# Patient Record
Sex: FEMALE | Race: WHITE | NOT HISPANIC OR LATINO | Employment: OTHER | URBAN - METROPOLITAN AREA
[De-identification: names, ages, dates, MRNs, and addresses within clinical notes are randomized per-mention and may not be internally consistent; named-entity substitution may affect disease eponyms.]

---

## 2020-12-13 ENCOUNTER — RECORDS - HEALTHEAST (OUTPATIENT)
Dept: LAB | Facility: CLINIC | Age: 85
End: 2020-12-13

## 2020-12-13 LAB
ALBUMIN UR-MCNC: NEGATIVE MG/DL
APPEARANCE UR: CLEAR
BILIRUB UR QL STRIP: NEGATIVE
COLOR UR AUTO: YELLOW
GLUCOSE UR STRIP-MCNC: NEGATIVE MG/DL
HGB UR QL STRIP: NEGATIVE
KETONES UR STRIP-MCNC: NEGATIVE MG/DL
LEUKOCYTE ESTERASE UR QL STRIP: NEGATIVE
NITRATE UR QL: NEGATIVE
PH UR STRIP: 7.5 [PH] (ref 4.5–8)
SP GR UR STRIP: 1.01 (ref 1–1.03)
UROBILINOGEN UR STRIP-ACNC: NORMAL

## 2020-12-16 LAB — BACTERIA SPEC CULT: ABNORMAL

## 2021-02-11 ENCOUNTER — RECORDS - HEALTHEAST (OUTPATIENT)
Dept: LAB | Facility: CLINIC | Age: 86
End: 2021-02-11

## 2021-02-11 LAB
SARS-COV-2 PCR COMMENT: NORMAL
SARS-COV-2 RNA SPEC QL NAA+PROBE: NEGATIVE
SARS-COV-2 VIRUS SPECIMEN SOURCE: NORMAL

## 2021-03-03 ENCOUNTER — RECORDS - HEALTHEAST (OUTPATIENT)
Dept: LAB | Facility: CLINIC | Age: 86
End: 2021-03-03

## 2021-03-03 LAB
ALBUMIN UR-MCNC: NEGATIVE MG/DL
APPEARANCE UR: CLEAR
BILIRUB UR QL STRIP: NEGATIVE
COLOR UR AUTO: YELLOW
GLUCOSE UR STRIP-MCNC: NEGATIVE MG/DL
HGB UR QL STRIP: NEGATIVE
KETONES UR STRIP-MCNC: NEGATIVE MG/DL
LEUKOCYTE ESTERASE UR QL STRIP: NEGATIVE
NITRATE UR QL: NEGATIVE
PH UR STRIP: 5 [PH] (ref 4.5–8)
SP GR UR STRIP: 1.02 (ref 1–1.03)
UROBILINOGEN UR STRIP-ACNC: NORMAL

## 2021-03-04 LAB — BACTERIA SPEC CULT: NO GROWTH

## 2021-04-22 ENCOUNTER — RECORDS - HEALTHEAST (OUTPATIENT)
Dept: LAB | Facility: CLINIC | Age: 86
End: 2021-04-22

## 2021-04-29 ENCOUNTER — RECORDS - HEALTHEAST (OUTPATIENT)
Dept: LAB | Facility: CLINIC | Age: 86
End: 2021-04-29

## 2021-04-29 LAB
ALBUMIN UR-MCNC: NEGATIVE G/DL
APPEARANCE UR: CLEAR
BILIRUB UR QL STRIP: NEGATIVE
COLOR UR AUTO: YELLOW
GLUCOSE UR STRIP-MCNC: NEGATIVE MG/DL
HGB UR QL STRIP: NEGATIVE
KETONES UR STRIP-MCNC: NORMAL MG/DL
LEUKOCYTE ESTERASE UR QL STRIP: NEGATIVE
NITRATE UR QL: NEGATIVE
PH UR STRIP: 5 [PH] (ref 5–8)
SP GR UR STRIP: 1.03 (ref 1–1.03)
UROBILINOGEN UR STRIP-ACNC: NORMAL

## 2021-04-30 LAB — BACTERIA SPEC CULT: NO GROWTH

## 2021-09-07 PROCEDURE — U0003 INFECTIOUS AGENT DETECTION BY NUCLEIC ACID (DNA OR RNA); SEVERE ACUTE RESPIRATORY SYNDROME CORONAVIRUS 2 (SARS-COV-2) (CORONAVIRUS DISEASE [COVID-19]), AMPLIFIED PROBE TECHNIQUE, MAKING USE OF HIGH THROUGHPUT TECHNOLOGIES AS DESCRIBED BY CMS-2020-01-R: HCPCS | Mod: ORL | Performed by: INTERNAL MEDICINE

## 2021-09-08 ENCOUNTER — LAB REQUISITION (OUTPATIENT)
Dept: LAB | Facility: CLINIC | Age: 86
End: 2021-09-08
Payer: COMMERCIAL

## 2021-09-08 DIAGNOSIS — U07.1 COVID-19: ICD-10-CM

## 2021-09-08 LAB — SARS-COV-2 RNA RESP QL NAA+PROBE: NEGATIVE

## 2021-09-15 PROCEDURE — U0003 INFECTIOUS AGENT DETECTION BY NUCLEIC ACID (DNA OR RNA); SEVERE ACUTE RESPIRATORY SYNDROME CORONAVIRUS 2 (SARS-COV-2) (CORONAVIRUS DISEASE [COVID-19]), AMPLIFIED PROBE TECHNIQUE, MAKING USE OF HIGH THROUGHPUT TECHNOLOGIES AS DESCRIBED BY CMS-2020-01-R: HCPCS | Mod: ORL | Performed by: INTERNAL MEDICINE

## 2021-09-16 ENCOUNTER — LAB REQUISITION (OUTPATIENT)
Dept: LAB | Facility: CLINIC | Age: 86
End: 2021-09-16
Payer: COMMERCIAL

## 2021-09-16 DIAGNOSIS — U07.1 COVID-19: ICD-10-CM

## 2021-09-17 LAB — SARS-COV-2 RNA RESP QL NAA+PROBE: NOT DETECTED

## 2022-01-06 PROCEDURE — U0003 INFECTIOUS AGENT DETECTION BY NUCLEIC ACID (DNA OR RNA); SEVERE ACUTE RESPIRATORY SYNDROME CORONAVIRUS 2 (SARS-COV-2) (CORONAVIRUS DISEASE [COVID-19]), AMPLIFIED PROBE TECHNIQUE, MAKING USE OF HIGH THROUGHPUT TECHNOLOGIES AS DESCRIBED BY CMS-2020-01-R: HCPCS | Mod: ORL | Performed by: INTERNAL MEDICINE

## 2022-01-07 ENCOUNTER — LAB REQUISITION (OUTPATIENT)
Dept: LAB | Facility: CLINIC | Age: 87
End: 2022-01-07
Payer: COMMERCIAL

## 2022-01-07 DIAGNOSIS — U07.1 COVID-19: ICD-10-CM

## 2022-01-07 LAB — SARS-COV-2 RNA RESP QL NAA+PROBE: NEGATIVE

## 2022-01-10 PROCEDURE — U0005 INFEC AGEN DETEC AMPLI PROBE: HCPCS | Mod: ORL | Performed by: INTERNAL MEDICINE

## 2022-01-11 ENCOUNTER — LAB REQUISITION (OUTPATIENT)
Dept: LAB | Facility: CLINIC | Age: 87
End: 2022-01-11
Payer: COMMERCIAL

## 2022-01-11 DIAGNOSIS — U07.1 COVID-19: ICD-10-CM

## 2022-01-12 ENCOUNTER — LAB REQUISITION (OUTPATIENT)
Dept: LAB | Facility: CLINIC | Age: 87
End: 2022-01-12
Payer: COMMERCIAL

## 2022-01-12 DIAGNOSIS — U07.1 COVID-19: ICD-10-CM

## 2022-01-12 LAB — SARS-COV-2 RNA RESP QL NAA+PROBE: NEGATIVE

## 2022-01-18 ENCOUNTER — LAB REQUISITION (OUTPATIENT)
Dept: LAB | Facility: CLINIC | Age: 87
End: 2022-01-18
Payer: COMMERCIAL

## 2022-01-18 DIAGNOSIS — U07.1 COVID-19: ICD-10-CM

## 2022-01-19 PROCEDURE — U0005 INFEC AGEN DETEC AMPLI PROBE: HCPCS | Mod: ORL | Performed by: INTERNAL MEDICINE

## 2022-01-20 LAB — SARS-COV-2 RNA RESP QL NAA+PROBE: NEGATIVE

## 2022-01-27 PROCEDURE — U0003 INFECTIOUS AGENT DETECTION BY NUCLEIC ACID (DNA OR RNA); SEVERE ACUTE RESPIRATORY SYNDROME CORONAVIRUS 2 (SARS-COV-2) (CORONAVIRUS DISEASE [COVID-19]), AMPLIFIED PROBE TECHNIQUE, MAKING USE OF HIGH THROUGHPUT TECHNOLOGIES AS DESCRIBED BY CMS-2020-01-R: HCPCS | Mod: ORL | Performed by: INTERNAL MEDICINE

## 2022-01-28 ENCOUNTER — LAB REQUISITION (OUTPATIENT)
Dept: LAB | Facility: CLINIC | Age: 87
End: 2022-01-28
Payer: COMMERCIAL

## 2022-01-28 DIAGNOSIS — U07.1 COVID-19: ICD-10-CM

## 2022-01-29 LAB — SARS-COV-2 RNA RESP QL NAA+PROBE: NEGATIVE

## 2022-02-02 PROCEDURE — U0005 INFEC AGEN DETEC AMPLI PROBE: HCPCS | Mod: ORL | Performed by: INTERNAL MEDICINE

## 2022-02-03 ENCOUNTER — LAB REQUISITION (OUTPATIENT)
Dept: LAB | Facility: CLINIC | Age: 87
End: 2022-02-03
Payer: COMMERCIAL

## 2022-02-03 DIAGNOSIS — U07.1 COVID-19: ICD-10-CM

## 2022-02-04 LAB — SARS-COV-2 RNA RESP QL NAA+PROBE: NOT DETECTED

## 2022-02-07 ENCOUNTER — LAB REQUISITION (OUTPATIENT)
Dept: LAB | Facility: CLINIC | Age: 87
End: 2022-02-07
Payer: COMMERCIAL

## 2022-02-07 DIAGNOSIS — U07.1 COVID-19: ICD-10-CM

## 2022-02-08 PROCEDURE — U0003 INFECTIOUS AGENT DETECTION BY NUCLEIC ACID (DNA OR RNA); SEVERE ACUTE RESPIRATORY SYNDROME CORONAVIRUS 2 (SARS-COV-2) (CORONAVIRUS DISEASE [COVID-19]), AMPLIFIED PROBE TECHNIQUE, MAKING USE OF HIGH THROUGHPUT TECHNOLOGIES AS DESCRIBED BY CMS-2020-01-R: HCPCS | Mod: ORL | Performed by: INTERNAL MEDICINE

## 2022-02-10 LAB — SARS-COV-2 RNA RESP QL NAA+PROBE: NEGATIVE

## 2022-02-16 ENCOUNTER — LAB REQUISITION (OUTPATIENT)
Dept: LAB | Facility: CLINIC | Age: 87
End: 2022-02-16
Payer: COMMERCIAL

## 2022-02-16 DIAGNOSIS — U07.1 COVID-19: ICD-10-CM

## 2022-02-16 PROCEDURE — U0003 INFECTIOUS AGENT DETECTION BY NUCLEIC ACID (DNA OR RNA); SEVERE ACUTE RESPIRATORY SYNDROME CORONAVIRUS 2 (SARS-COV-2) (CORONAVIRUS DISEASE [COVID-19]), AMPLIFIED PROBE TECHNIQUE, MAKING USE OF HIGH THROUGHPUT TECHNOLOGIES AS DESCRIBED BY CMS-2020-01-R: HCPCS | Mod: ORL | Performed by: INTERNAL MEDICINE

## 2022-02-17 LAB — SARS-COV-2 RNA RESP QL NAA+PROBE: NEGATIVE

## 2022-04-15 PROCEDURE — U0005 INFEC AGEN DETEC AMPLI PROBE: HCPCS | Mod: ORL | Performed by: INTERNAL MEDICINE

## 2022-04-16 ENCOUNTER — LAB REQUISITION (OUTPATIENT)
Dept: LAB | Facility: CLINIC | Age: 87
End: 2022-04-16
Payer: COMMERCIAL

## 2022-04-16 DIAGNOSIS — U07.1 COVID-19: ICD-10-CM

## 2022-04-16 LAB — SARS-COV-2 RNA RESP QL NAA+PROBE: NEGATIVE

## 2022-04-20 ENCOUNTER — LAB REQUISITION (OUTPATIENT)
Dept: LAB | Facility: CLINIC | Age: 87
End: 2022-04-20
Payer: COMMERCIAL

## 2022-04-20 DIAGNOSIS — U07.1 COVID-19: ICD-10-CM

## 2022-04-20 PROCEDURE — U0003 INFECTIOUS AGENT DETECTION BY NUCLEIC ACID (DNA OR RNA); SEVERE ACUTE RESPIRATORY SYNDROME CORONAVIRUS 2 (SARS-COV-2) (CORONAVIRUS DISEASE [COVID-19]), AMPLIFIED PROBE TECHNIQUE, MAKING USE OF HIGH THROUGHPUT TECHNOLOGIES AS DESCRIBED BY CMS-2020-01-R: HCPCS | Mod: ORL | Performed by: INTERNAL MEDICINE

## 2022-04-21 LAB — SARS-COV-2 RNA RESP QL NAA+PROBE: NEGATIVE

## 2022-04-27 ENCOUNTER — LAB REQUISITION (OUTPATIENT)
Dept: LAB | Facility: CLINIC | Age: 87
End: 2022-04-27
Payer: COMMERCIAL

## 2022-04-27 DIAGNOSIS — U07.1 COVID-19: ICD-10-CM

## 2022-04-27 PROCEDURE — U0005 INFEC AGEN DETEC AMPLI PROBE: HCPCS | Mod: ORL | Performed by: INTERNAL MEDICINE

## 2022-04-28 LAB — SARS-COV-2 RNA RESP QL NAA+PROBE: POSITIVE

## 2024-05-13 ENCOUNTER — DOCUMENTATION ONLY (OUTPATIENT)
Dept: OTHER | Facility: CLINIC | Age: 89
End: 2024-05-13
Payer: COMMERCIAL

## 2024-05-21 ENCOUNTER — DOCUMENTATION ONLY (OUTPATIENT)
Dept: GERIATRICS | Facility: CLINIC | Age: 89
End: 2024-05-21
Payer: COMMERCIAL

## 2024-05-24 ENCOUNTER — ASSISTED LIVING VISIT (OUTPATIENT)
Dept: GERIATRICS | Facility: CLINIC | Age: 89
End: 2024-05-24
Payer: COMMERCIAL

## 2024-05-24 VITALS
DIASTOLIC BLOOD PRESSURE: 68 MMHG | HEIGHT: 67 IN | OXYGEN SATURATION: 92 % | SYSTOLIC BLOOD PRESSURE: 111 MMHG | HEART RATE: 83 BPM | BODY MASS INDEX: 17.27 KG/M2 | TEMPERATURE: 96.1 F | RESPIRATION RATE: 14 BRPM | WEIGHT: 110 LBS

## 2024-05-24 DIAGNOSIS — F03.B4 MODERATE DEMENTIA WITH ANXIETY, UNSPECIFIED DEMENTIA TYPE (H): ICD-10-CM

## 2024-05-24 DIAGNOSIS — M81.0 AGE RELATED OSTEOPOROSIS, UNSPECIFIED PATHOLOGICAL FRACTURE PRESENCE: ICD-10-CM

## 2024-05-24 DIAGNOSIS — K21.00 GASTROESOPHAGEAL REFLUX DISEASE WITH ESOPHAGITIS WITHOUT HEMORRHAGE: Primary | ICD-10-CM

## 2024-05-24 DIAGNOSIS — F41.9 ANXIETY: ICD-10-CM

## 2024-05-24 PROCEDURE — 99344 HOME/RES VST NEW MOD MDM 60: CPT | Performed by: NURSE PRACTITIONER

## 2024-05-24 RX ORDER — COMPRESS.STOCKING,KNEE,REG,MED
EACH MISCELLANEOUS 2 TIMES DAILY
COMMUNITY

## 2024-05-24 RX ORDER — AMOXICILLIN 500 MG/1
500 TABLET, FILM COATED ORAL 3 TIMES DAILY
COMMUNITY
Start: 2024-05-24 | End: 2024-05-28

## 2024-05-24 RX ORDER — CEPHALEXIN 500 MG/1
500 CAPSULE ORAL 3 TIMES DAILY
COMMUNITY
Start: 2024-05-24 | End: 2024-05-30

## 2024-05-24 RX ORDER — ESTRADIOL 0.1 MG/G
CREAM VAGINAL DAILY
COMMUNITY
Start: 2024-05-24

## 2024-05-24 RX ORDER — ESCITALOPRAM OXALATE 10 MG/1
TABLET ORAL
COMMUNITY
Start: 2023-12-14

## 2024-05-24 NOTE — LETTER
5/24/2024        RE: Mariah Dewitt  [facility] [address] [facility] [address]        [facility] GERIATRICS    PRIMARY CARE PROVIDER AND CLINIC:  Tamie Costa NP, [address] [facility] [address]  Chief Complaint   Patient presents with     [facility] Medical Record Number:  [id number]  Place of Service where encounter took place:  [facility] ([facility]) [[id number]]    Mariah Dewitt  is a 88 year old  (1935), admitted to the above facility from home due to care needs  . .   HPI:    Patient resting in dining area. HPI difficult to obtain 2/2 baseline cognitive impairment. No pain at this time, denies SOB and CP. Vital signs appear withiin normal limits. Was complaining of generalized stomach upset last week but appears to be improved.    BP Readings from Last 3 Encounters:   05/24/24 111/68   05/16/24 111/68     Pulse Readings from Last 4 Encounters:   05/24/24 83   05/16/24 83     Wt Readings from Last 4 Encounters:   05/24/24 49.9 kg (110 lb)   05/16/24 49.9 kg (110 lb)          CODE STATUS/ADVANCE DIRECTIVES DISCUSSION:  No Order  DNR / DNI  ALLERGIES:   Allergies   Allergen Reactions     Actonel [Risedronate]      Cephalexin Other (See Comments)     Did not feel well     Cholera Vaccine Other (See Comments)     Codeine      Contrast Dye      IVP DYE     Fosamax [Alendronate]      Gadobenate Dimeglumine [Gadobenate]      Other Drug Allergy (See Comments)      Iodinated Diagnostic Agents     Diatrizoate Rash      PAST MEDICAL HISTORY:   Past Medical History:   Diagnosis Date     Calculus in urethra      Closed fracture of right distal femur (H)      Dementia with other behavioral disturbance, unspecified dementia severity, unspecified dementia type (H)      ZENAIDA (generalized anxiety disorder)      Gallbladder calculus without cholecystitis and no obstruction      Gastroesophageal reflux disease with esophagitis, unspecified whether hemorrhage       "Generalized muscle weakness      Osteoporosis      Presence of right artificial hip joint      Pulmonary nodules      Unspecified urethral stricture, female       PAST SURGICAL HISTORY:   has a past surgical history that includes DIALATION AND CURETTAGE; SCALP BCCA'S; Surgical Pathology Exam (Left); and RESECTION OF UPPER BACK (10/2015).  FAMILY HISTORY: family history includes Dementia in her mother; Glaucoma in her sister; Hypertension in her mother; Myocardial Infarction in her mother; Osteoporosis in her sister; Other - See Comments in her brother; Pneumonia in her father and mother.  SOCIAL HISTORY:     Patient's living condition: lives in an assisted living facility    Post Discharge Medication Reconciliation Status:   MED REC REQUIRED  Post Medication Reconciliation Status: patient was not discharged from an inpatient facility or TCU       Current Outpatient Medications   Medication Sig Dispense Refill     amoxicillin (AMOXIL) 500 MG tablet Take 500 mg by mouth 3 times daily FOR 5 DAYS. STARTED 5/24/24.       cephALEXin (KEFLEX) 500 MG capsule Take 500 mg by mouth 3 times daily X 7 days. Per [facility]       Elastic Bandages & Supports (T.E.D. ANTI-EMBOLISM STOCKINGS) MISC 2 times daily On am off hs.       escitalopram (LEXAPRO) 10 MG tablet Take 5 mg (1/2 tablet) daily for 2 weeks and then increase to 10 mg (1 tablet) daily     Instructions: Take 5 mg (1/2 tablet) daily for 2 weeks and then increase to 10 mg (1 tablet) daily       estradiol (ESTRACE) 0.1 MG/GM vaginal cream Place vaginally daily PLACE A PEA SIZE AMOUNT OF CREAM INTO VAGINA ONCE DAILY FOR TWO WEEKS THEN TWICE WEEKLY       No current facility-administered medications for this visit.       ROS:  Unobtainable secondary to cognitive impairment.     Vitals:  /68   Pulse 83   Temp (!) 96.1  F (35.6  C)   Resp 14   Ht 1.702 m (5' 7\")   Wt 49.9 kg (110 lb)   SpO2 92%   BMI 17.23 kg/m    Exam:  GENERAL APPEARANCE:  Alert, in no " distress  ENT:  Mouth and posterior oropharynx normal, moist mucous membranes, hearing acuity [place]  EYES:  EOM, conjunctivae, lids, pupils and irises normal  NECK:  No adenopathy,masses or thyromegaly  RESP:  respiratory effort and palpation of chest normal, no respiratory distress, Lung sounds clear  CV:  Palpation and auscultation of heart done , rate and rhythm regular, no murmur, no rub or gallop, Edema none  ABDOMEN:  normal bowel sounds, soft, nontender, no hepatosplenomegaly or other masses  M/S:   Gait and station baseline, Digits and nails WNL  SKIN:  Inspection/Palpation of skin and subcutaneous tissue WNL  NEURO: 2-12 in normal limits and at patient's baseline  PSYCH:  insight and judgement, memory impaired , affect and mood normal      Lab/Diagnostic data:  Recent labs in [facility] reviewed by me today.     ASSESSMENT/PLAN:    (K21.00) Gastroesophageal reflux disease with esophagitis without hemorrhage  (primary encounter diagnosis)  Comment: Chronic, without s/symptom of dyspepsia.   Plan: Off medications at this time. Monitor and update NP with changes.     (M81.0) Age related osteoporosis, unspecified pathological fracture presence  Comment: Chronic, off medications. No pain  Plan: Continue with plan of care no changes at this time, adjustment as needed    (F41.9) Anxiety  (F03.B4) Moderate dementia with anxiety, unspecified dementia type (H)  Comment: Chronic, progressive.   Plan:   Continue to anticipate needs. Chronic condition, ongoing decline expected.   - Continue to provide redirection and reassurance as needed. Maintain safe living situation with goals focused on comfort.      Orders:  CBC and BMP- patient not feeling well last week     Electronically signed by:  Tamie Costa NP                     Sincerely,        Tamie Costa NP

## 2024-05-24 NOTE — PROGRESS NOTES
[facility] GERIATRICS    PRIMARY CARE PROVIDER AND CLINIC:  Tamie Costa, NP, [address] [facility] [address]  Chief Complaint   Patient presents with    [facility] Medical Record Number:  [id number]  Place of Service where encounter took place:  [facility] ([facility]) [[id number]]    Mariah Dewitt  is a 88 year old  (1935), admitted to the above facility from home due to care needs  . .   HPI:    Patient resting in dining area. HPI difficult to obtain 2/2 baseline cognitive impairment. No pain at this time, denies SOB and CP. Vital signs appear withiin normal limits. Was complaining of generalized stomach upset last week but appears to be improved.    BP Readings from Last 3 Encounters:   05/24/24 111/68   05/16/24 111/68     Pulse Readings from Last 4 Encounters:   05/24/24 83   05/16/24 83     Wt Readings from Last 4 Encounters:   05/24/24 49.9 kg (110 lb)   05/16/24 49.9 kg (110 lb)          CODE STATUS/ADVANCE DIRECTIVES DISCUSSION:  No Order  DNR / DNI  ALLERGIES:   Allergies   Allergen Reactions    Actonel [Risedronate]     Cephalexin Other (See Comments)     Did not feel well    Cholera Vaccine Other (See Comments)    Codeine     Contrast Dye      IVP DYE    Fosamax [Alendronate]     Gadobenate Dimeglumine [Gadobenate]     Other Drug Allergy (See Comments)      Iodinated Diagnostic Agents    Diatrizoate Rash      PAST MEDICAL HISTORY:   Past Medical History:   Diagnosis Date    Calculus in urethra     Closed fracture of right distal femur (H)     Dementia with other behavioral disturbance, unspecified dementia severity, unspecified dementia type (H)     ZENAIDA (generalized anxiety disorder)     Gallbladder calculus without cholecystitis and no obstruction     Gastroesophageal reflux disease with esophagitis, unspecified whether hemorrhage     Generalized muscle weakness     Osteoporosis     Presence of right artificial hip joint     Pulmonary nodules      "Unspecified urethral stricture, female       PAST SURGICAL HISTORY:   has a past surgical history that includes DIALATION AND CURETTAGE; SCALP BCCA'S; Surgical Pathology Exam (Left); and RESECTION OF UPPER BACK (10/2015).  FAMILY HISTORY: family history includes Dementia in her mother; Glaucoma in her sister; Hypertension in her mother; Myocardial Infarction in her mother; Osteoporosis in her sister; Other - See Comments in her brother; Pneumonia in her father and mother.  SOCIAL HISTORY:     Patient's living condition: lives in an assisted living facility    Post Discharge Medication Reconciliation Status:   MED REC REQUIRED  Post Medication Reconciliation Status: patient was not discharged from an inpatient facility or TCU       Current Outpatient Medications   Medication Sig Dispense Refill    amoxicillin (AMOXIL) 500 MG tablet Take 500 mg by mouth 3 times daily FOR 5 DAYS. STARTED 5/24/24.      cephALEXin (KEFLEX) 500 MG capsule Take 500 mg by mouth 3 times daily X 7 days. Per [facility]      Elastic Bandages & Supports (T.E.D. ANTI-EMBOLISM STOCKINGS) MISC 2 times daily On am off hs.      escitalopram (LEXAPRO) 10 MG tablet Take 5 mg (1/2 tablet) daily for 2 weeks and then increase to 10 mg (1 tablet) daily     Instructions: Take 5 mg (1/2 tablet) daily for 2 weeks and then increase to 10 mg (1 tablet) daily      estradiol (ESTRACE) 0.1 MG/GM vaginal cream Place vaginally daily PLACE A PEA SIZE AMOUNT OF CREAM INTO VAGINA ONCE DAILY FOR TWO WEEKS THEN TWICE WEEKLY       No current facility-administered medications for this visit.       ROS:  Unobtainable secondary to cognitive impairment.     Vitals:  /68   Pulse 83   Temp (!) 96.1  F (35.6  C)   Resp 14   Ht 1.702 m (5' 7\")   Wt 49.9 kg (110 lb)   SpO2 92%   BMI 17.23 kg/m    Exam:  GENERAL APPEARANCE:  Alert, in no distress  ENT:  Mouth and posterior oropharynx normal, moist mucous membranes, hearing acuity [place]  EYES:  EOM, conjunctivae, lids, " pupils and irises normal  NECK:  No adenopathy,masses or thyromegaly  RESP:  respiratory effort and palpation of chest normal, no respiratory distress, Lung sounds clear  CV:  Palpation and auscultation of heart done , rate and rhythm regular, no murmur, no rub or gallop, Edema none  ABDOMEN:  normal bowel sounds, soft, nontender, no hepatosplenomegaly or other masses  M/S:   Gait and station baseline, Digits and nails WNL  SKIN:  Inspection/Palpation of skin and subcutaneous tissue WNL  NEURO: 2-12 in normal limits and at patient's baseline  PSYCH:  insight and judgement, memory impaired , affect and mood normal      Lab/Diagnostic data:  Recent labs in [facility] reviewed by me today.     ASSESSMENT/PLAN:    (K21.00) Gastroesophageal reflux disease with esophagitis without hemorrhage  (primary encounter diagnosis)  Comment: Chronic, without s/symptom of dyspepsia.   Plan: Off medications at this time. Monitor and update NP with changes.     (M81.0) Age related osteoporosis, unspecified pathological fracture presence  Comment: Chronic, off medications. No pain  Plan: Continue with plan of care no changes at this time, adjustment as needed    (F41.9) Anxiety  (F03.B4) Moderate dementia with anxiety, unspecified dementia type (H)  Comment: Chronic, progressive.   Plan:   Continue to anticipate needs. Chronic condition, ongoing decline expected.   - Continue to provide redirection and reassurance as needed. Maintain safe living situation with goals focused on comfort.      Orders:  CBC and BMP- patient not feeling well last week     Electronically signed by:  Tamie Costa NP

## 2024-05-25 ENCOUNTER — LAB REQUISITION (OUTPATIENT)
Dept: LAB | Facility: CLINIC | Age: 89
End: 2024-05-25
Payer: COMMERCIAL

## 2024-05-25 DIAGNOSIS — R53.81 OTHER MALAISE: ICD-10-CM

## 2024-05-28 LAB
ANION GAP SERPL CALCULATED.3IONS-SCNC: 10 MMOL/L (ref 7–15)
BUN SERPL-MCNC: 16.2 MG/DL (ref 8–23)
CALCIUM SERPL-MCNC: 9.1 MG/DL (ref 8.8–10.2)
CHLORIDE SERPL-SCNC: 107 MMOL/L (ref 98–107)
CREAT SERPL-MCNC: 0.62 MG/DL (ref 0.51–0.95)
DEPRECATED HCO3 PLAS-SCNC: 26 MMOL/L (ref 22–29)
EGFRCR SERPLBLD CKD-EPI 2021: 86 ML/MIN/1.73M2
ERYTHROCYTE [DISTWIDTH] IN BLOOD BY AUTOMATED COUNT: 13.8 % (ref 10–15)
GLUCOSE SERPL-MCNC: 113 MG/DL (ref 70–99)
HCT VFR BLD AUTO: 37.4 % (ref 35–47)
HGB BLD-MCNC: 12.1 G/DL (ref 11.7–15.7)
MCH RBC QN AUTO: 31.8 PG (ref 26.5–33)
MCHC RBC AUTO-ENTMCNC: 32.4 G/DL (ref 31.5–36.5)
MCV RBC AUTO: 98 FL (ref 78–100)
PLATELET # BLD AUTO: 278 10E3/UL (ref 150–450)
POTASSIUM SERPL-SCNC: 4.6 MMOL/L (ref 3.4–5.3)
RBC # BLD AUTO: 3.8 10E6/UL (ref 3.8–5.2)
SODIUM SERPL-SCNC: 143 MMOL/L (ref 135–145)
WBC # BLD AUTO: 9.4 10E3/UL (ref 4–11)

## 2024-05-28 PROCEDURE — 85027 COMPLETE CBC AUTOMATED: CPT | Mod: ORL | Performed by: NURSE PRACTITIONER

## 2024-05-28 PROCEDURE — P9603 ONE-WAY ALLOW PRORATED MILES: HCPCS | Mod: ORL | Performed by: NURSE PRACTITIONER

## 2024-05-28 PROCEDURE — 36415 COLL VENOUS BLD VENIPUNCTURE: CPT | Mod: ORL | Performed by: NURSE PRACTITIONER

## 2024-05-28 PROCEDURE — 80048 BASIC METABOLIC PNL TOTAL CA: CPT | Mod: ORL | Performed by: NURSE PRACTITIONER

## 2024-06-04 ENCOUNTER — PATIENT OUTREACH (OUTPATIENT)
Dept: GERIATRIC MEDICINE | Facility: CLINIC | Age: 89
End: 2024-06-04
Payer: COMMERCIAL

## 2024-06-04 NOTE — PROGRESS NOTES
[facility] Care Coordination Contact    Member became effective with [facility] on 6/1/2024 with [employer].     Welcome letter sent to daughter Khloe LOW.     Kaylyn Canchola  Care Management Specialist   [facility]   [phone]

## 2024-06-04 NOTE — LETTER
June 4, 2024      MARIAH ZAMORA  C/O Khloe [address]   [address] [place] [address]      Dear Mariah:    Any, my name is Emerita Avery RN. You are eligible for [facility] Case Management program through your enrollment in [employer]. We think you may benefit from this program. I am writing to invite you to be in our Case Management program.     The following are a few things the Case Management program can help you with:  Select or change your primary care doctor or primary care clinic  Find a specialist, if needed, near your home  Receive preventive care, such as flu shots  Join programs offered by [facility] that interest you, like wellness programs    As your , I will do the following to enroll you in the Case Management Program:  Schedule a telephone call with you to answer any questions you may have about case management  Conduct an assessment by phone to identify needs case management can help you with  Develop a care plan to address those needs  Help you obtain available care and resources as needed    I will call you soon to discuss your interest in this program and your health care needs.    Being in the Case Management program is voluntary and offered to you at no cost. If you accept being in the Case Management program, you can stop any time by calling me at [phone].    Sincerely,      Emerita Avery RN    E-mail: [email or web address]  Phone: [phone]      [facility]    [id number]                                     (01/2020)          [address] [place] [address]  [phone]  fax [phone]  [facility]

## 2024-06-10 ENCOUNTER — PATIENT OUTREACH (OUTPATIENT)
Dept: GERIATRIC MEDICINE | Facility: CLINIC | Age: 89
End: 2024-06-10
Payer: COMMERCIAL

## 2024-06-10 NOTE — PROGRESS NOTES
[facility] Care Coordination Contact    Member enrolled in [facility] with [facility] Medicare effective 6/1/24.     Reviewed Epic notes and no triggering events noted - no recent hospitalizations or ED visits. Daily assistance (including med management) provided by assisted living facility. Recent move to assisted living and established care with onsite provider on 5/24/24.    Writer will continue to follow via EMR and is available as needed.     Emerita Avery RN  [facility]  Cell: [phone]

## 2024-09-26 ENCOUNTER — ASSISTED LIVING VISIT (OUTPATIENT)
Dept: GERIATRICS | Facility: CLINIC | Age: 89
End: 2024-09-26
Payer: COMMERCIAL

## 2024-09-26 VITALS
RESPIRATION RATE: 20 BRPM | DIASTOLIC BLOOD PRESSURE: 60 MMHG | HEIGHT: 67 IN | WEIGHT: 117 LBS | HEART RATE: 90 BPM | TEMPERATURE: 97.1 F | OXYGEN SATURATION: 98 % | BODY MASS INDEX: 18.36 KG/M2 | SYSTOLIC BLOOD PRESSURE: 109 MMHG

## 2024-09-26 DIAGNOSIS — M81.0 AGE RELATED OSTEOPOROSIS, UNSPECIFIED PATHOLOGICAL FRACTURE PRESENCE: ICD-10-CM

## 2024-09-26 DIAGNOSIS — R63.5 WEIGHT GAIN: ICD-10-CM

## 2024-09-26 DIAGNOSIS — F03.B4 MODERATE DEMENTIA WITH ANXIETY, UNSPECIFIED DEMENTIA TYPE (H): Primary | ICD-10-CM

## 2024-09-26 DIAGNOSIS — K21.00 GASTROESOPHAGEAL REFLUX DISEASE WITH ESOPHAGITIS WITHOUT HEMORRHAGE: ICD-10-CM

## 2024-09-26 PROBLEM — L53.9 DEPENDENT RUBOR: Status: ACTIVE | Noted: 2020-03-06

## 2024-09-26 PROBLEM — Z96.649 HISTORY OF HEMIARTHROPLASTY OF HIP: Status: ACTIVE | Noted: 2019-06-03

## 2024-09-26 PROBLEM — K40.90 INGUINAL HERNIA OF RIGHT SIDE WITHOUT OBSTRUCTION OR GANGRENE: Status: ACTIVE | Noted: 2020-01-10

## 2024-09-26 PROBLEM — S72.001D CLOSED FRACTURE OF NECK OF RIGHT FEMUR WITH ROUTINE HEALING: Status: ACTIVE | Noted: 2019-05-08

## 2024-09-26 PROBLEM — F41.9 ANXIETY: Status: ACTIVE | Noted: 2017-11-30

## 2024-09-26 PROBLEM — F03.90 DEMENTIA WITHOUT BEHAVIORAL DISTURBANCE (H): Status: ACTIVE | Noted: 2022-03-26

## 2024-09-26 PROBLEM — F41.1 GENERALIZED ANXIETY DISORDER: Status: ACTIVE | Noted: 2019-05-08

## 2024-09-26 PROBLEM — R03.0 ELEVATED BP WITHOUT DIAGNOSIS OF HYPERTENSION: Status: ACTIVE | Noted: 2018-06-06

## 2024-09-26 PROCEDURE — 99350 HOME/RES VST EST HIGH MDM 60: CPT | Performed by: NURSE PRACTITIONER

## 2024-09-26 RX ORDER — QUETIAPINE FUMARATE 25 MG/1
12.5 TABLET, FILM COATED ORAL 2 TIMES DAILY
Status: SHIPPED
Start: 2024-09-26

## 2024-09-26 NOTE — LETTER
" 9/26/2024      Mariah Dewitt  C/o Khloe Elliott  [address]  [place] [address]        [facility] GERIATRICS    Chief Complaint   Patient presents with     Nursing Home Acute     Routine Visit     HPI:  Mariah Dewitt is a 89 year old  (1935), who is being seen today for an episodic care visit at: [facility] ([facility]) [[id number]]. Today's concern is:   Patient resting in dining area upon visit.  Compliant with exam but does have flat affect.  Has history of dementia with behaviors, patient previously redirectable now staff having more difficulty performing cares.  Currently the patient has no orders for medications.  Writer has attempted x 2 to contact POA regarding initiating medications for behaviors.  HPI difficult to obtain secondary baseline impairment.  No indications of acute pain, chest pain, shortness of breath or lightheadedness.    Vital signs reviewed by me today  BP Readings from Last 3 Encounters:   09/26/24 109/60   08/27/24 133/85   05/24/24 111/68     Pulse Readings from Last 4 Encounters:   09/26/24 90   08/27/24 75   05/24/24 83   05/16/24 83     Wt Readings from Last 4 Encounters:   09/26/24 53.1 kg (117 lb)   08/27/24 50.7 kg (111 lb 12.8 oz)   05/24/24 49.9 kg (110 lb)   05/16/24 49.9 kg (110 lb)     Allergies, and PMH/PSH reviewed in EPIC today.  REVIEW OF SYSTEMS:  Unobtainable secondary to cognitive impairment.     Objective:   /60   Pulse 90   Temp 97.1  F (36.2  C)   Resp 20   Ht 1.702 m (5' 7\")   Wt 53.1 kg (117 lb)   SpO2 98%   BMI 18.32 kg/m    A & O x 1-2, NAD. Lungs CTA, non labored. RRR, S1/S2 w/o murmur,gallop or rub.  No edema.  Abdomen soft, nontender, +BT'S. No focal neurological deficits.        Recent labs in [facility] reviewed by me today.   Most Recent 3 CBC's:  Recent Labs   Lab Test 05/28/24  1149   WBC 9.4   HGB 12.1   MCV 98        Most Recent 3 BMP's:  Recent Labs   Lab Test 05/28/24  1149      POTASSIUM 4.6   CHLORIDE 107 "   CO2 26   BUN 16.2   CR 0.62   ANIONGAP 10   LYDIA 9.1   *     Most Recent 3 Creatinines:  Recent Labs   Lab Test 05/28/24  1149   CR 0.62     Most Recent 3 Hemoglobins:  Recent Labs   Lab Test 05/28/24  1149   HGB 12.1       Assessment/Plan:  Moderate dementia with anxiety, unspecified dementia type (H)  Chronic, progressive.  Now with increased behaviors that are difficult to redirect.  Writer attempted to contact POA x 2 to discuss medication management.  -Will start Seroquel 12.5 mg p.o. twice daily.  Instructed staff at [facility] to attempt to contact family as well.  Continue to monitor and update NP with changes.  Continue to anticipate patient's need.  Continue AL assist with medication ministration, meals, safety.  Expect further decline with progression of disease.    Gastroesophageal reflux disease with esophagitis without hemorrhage  Chronic, stable off medications.Continue with plan of care no changes at this time, adjustment as needed    Age related osteoporosis, unspecified pathological fracture presence  Chronic, stable.  Patient is without indications of acute pain.    Weight gain  New onset of 8 pound weight gain over previous month.  No edema noted.  Patient denies shortness of breath.  -TSH, A1c, BNP, vitamin D level, CBC, BMP next lab day    MED REC REQUIRED  Post Medication Reconciliation Status: patient was not discharged from an inpatient facility or TCU    67 minutes spent on the date of the encounter doing chart review, history and exam, documentation and further activities as noted above.       Orders:  Seroquel 12.5 mg PO BID    Electronically signed by: Tamie Costa NP           Sincerely,        Tamie Costa NP

## 2024-09-26 NOTE — PROGRESS NOTES
"[facility] GERIATRICS    Chief Complaint   Patient presents with    Nursing Home Acute     Routine Visit     HPI:  Mariah Dewitt is a 89 year old  (1935), who is being seen today for an episodic care visit at: [facility]) [[id number]]. Today's concern is:   Patient resting in dining area upon visit.  Compliant with exam but does have flat affect.  Has history of dementia with behaviors, patient previously redirectable now staff having more difficulty performing cares.  Currently the patient has no orders for medications.  Writer has attempted x 2 to contact POA regarding initiating medications for behaviors.  HPI difficult to obtain secondary baseline impairment.  No indications of acute pain, chest pain, shortness of breath or lightheadedness.    Vital signs reviewed by me today  BP Readings from Last 3 Encounters:   09/26/24 109/60   08/27/24 133/85   05/24/24 111/68     Pulse Readings from Last 4 Encounters:   09/26/24 90   08/27/24 75   05/24/24 83   05/16/24 83     Wt Readings from Last 4 Encounters:   09/26/24 53.1 kg (117 lb)   08/27/24 50.7 kg (111 lb 12.8 oz)   05/24/24 49.9 kg (110 lb)   05/16/24 49.9 kg (110 lb)     Allergies, and PMH/PSH reviewed in [facility] today.  REVIEW OF SYSTEMS:  Unobtainable secondary to cognitive impairment.     Objective:   /60   Pulse 90   Temp 97.1  F (36.2  C)   Resp 20   Ht 1.702 m (5' 7\")   Wt 53.1 kg (117 lb)   SpO2 98%   BMI 18.32 kg/m    A & O x 1-2, NAD. Lungs CTA, non labored. RRR, S1/S2 w/o murmur,gallop or rub.  No edema.  Abdomen soft, nontender, +BT'S. No focal neurological deficits.        Recent labs in [facility] reviewed by me today.   Most Recent 3 CBC's:  Recent Labs   Lab Test 05/28/24  1149   WBC 9.4   HGB 12.1   MCV 98        Most Recent 3 BMP's:  Recent Labs   Lab Test 05/28/24  1149      POTASSIUM 4.6   CHLORIDE 107   CO2 26   BUN 16.2   CR 0.62   ANIONGAP 10   LYDIA 9.1   *     Most Recent 3 " Creatinines:  Recent Labs   Lab Test 05/28/24  1149   CR 0.62     Most Recent 3 Hemoglobins:  Recent Labs   Lab Test 05/28/24  1149   HGB 12.1       Assessment/Plan:  Moderate dementia with anxiety, unspecified dementia type (H)  Chronic, progressive.  Now with increased behaviors that are difficult to redirect.  Writer attempted to contact POA x 2 to discuss medication management.  -Will start Seroquel 12.5 mg p.o. twice daily.  Instructed staff at [facility] to attempt to contact family as well.  Continue to monitor and update NP with changes.  Continue to anticipate patient's need.  Continue AL assist with medication ministration, meals, safety.  Expect further decline with progression of disease.    Gastroesophageal reflux disease with esophagitis without hemorrhage  Chronic, stable off medications.Continue with plan of care no changes at this time, adjustment as needed    Age related osteoporosis, unspecified pathological fracture presence  Chronic, stable.  Patient is without indications of acute pain.    Weight gain  New onset of 8 pound weight gain over previous month.  No edema noted.  Patient denies shortness of breath.  -TSH, A1c, BNP, vitamin D level, CBC, BMP next lab day    MED REC REQUIRED  Post Medication Reconciliation Status: patient was not discharged from an inpatient facility or TCU    67 minutes spent on the date of the encounter doing chart review, history and exam, documentation and further activities as noted above.       Orders:  Seroquel 12.5 mg PO BID    Electronically signed by: Tamie Costa NP

## 2024-09-27 ENCOUNTER — LAB REQUISITION (OUTPATIENT)
Dept: LAB | Facility: CLINIC | Age: 89
End: 2024-09-27
Payer: COMMERCIAL

## 2024-09-27 DIAGNOSIS — F02.818 DEMENTIA IN OTHER DISEASES CLASSIFIED ELSEWHERE, UNSPECIFIED SEVERITY, WITH OTHER BEHAVIORAL DISTURBANCE (H): ICD-10-CM

## 2024-10-08 ENCOUNTER — TELEPHONE (OUTPATIENT)
Dept: GERIATRICS | Facility: CLINIC | Age: 89
End: 2024-10-08

## 2024-10-08 LAB
ANION GAP SERPL CALCULATED.3IONS-SCNC: 10 MMOL/L (ref 7–15)
BASOPHILS # BLD AUTO: 0.1 10E3/UL (ref 0–0.2)
BASOPHILS NFR BLD AUTO: 1 %
BUN SERPL-MCNC: 23.7 MG/DL (ref 8–23)
CALCIUM SERPL-MCNC: 9.1 MG/DL (ref 8.8–10.4)
CHLORIDE SERPL-SCNC: 104 MMOL/L (ref 98–107)
CREAT SERPL-MCNC: 0.85 MG/DL (ref 0.51–0.95)
EGFRCR SERPLBLD CKD-EPI 2021: 66 ML/MIN/1.73M2
EOSINOPHIL # BLD AUTO: 0 10E3/UL (ref 0–0.7)
EOSINOPHIL NFR BLD AUTO: 0 %
ERYTHROCYTE [DISTWIDTH] IN BLOOD BY AUTOMATED COUNT: 14.2 % (ref 10–15)
EST. AVERAGE GLUCOSE BLD GHB EST-MCNC: 105 MG/DL
GLUCOSE SERPL-MCNC: 79 MG/DL (ref 70–99)
HBA1C MFR BLD: 5.3 %
HCO3 SERPL-SCNC: 26 MMOL/L (ref 22–29)
HCT VFR BLD AUTO: 35.6 % (ref 35–47)
HGB BLD-MCNC: 10.9 G/DL (ref 11.7–15.7)
IMM GRANULOCYTES # BLD: 0.1 10E3/UL
IMM GRANULOCYTES NFR BLD: 1 %
LYMPHOCYTES # BLD AUTO: 0.6 10E3/UL (ref 0.8–5.3)
LYMPHOCYTES NFR BLD AUTO: 5 %
MCH RBC QN AUTO: 30.5 PG (ref 26.5–33)
MCHC RBC AUTO-ENTMCNC: 30.6 G/DL (ref 31.5–36.5)
MCV RBC AUTO: 100 FL (ref 78–100)
MONOCYTES # BLD AUTO: 0.9 10E3/UL (ref 0–1.3)
MONOCYTES NFR BLD AUTO: 9 %
NEUTROPHILS # BLD AUTO: 8.8 10E3/UL (ref 1.6–8.3)
NEUTROPHILS NFR BLD AUTO: 85 %
NRBC # BLD AUTO: 0 10E3/UL
NRBC BLD AUTO-RTO: 0 /100
NT-PROBNP SERPL-MCNC: 584 PG/ML (ref 0–1800)
PLAT MORPH BLD: NORMAL
PLATELET # BLD AUTO: 262 10E3/UL (ref 150–450)
POTASSIUM SERPL-SCNC: 4 MMOL/L (ref 3.4–5.3)
RBC # BLD AUTO: 3.57 10E6/UL (ref 3.8–5.2)
RBC MORPH BLD: NORMAL
SODIUM SERPL-SCNC: 140 MMOL/L (ref 135–145)
TSH SERPL DL<=0.005 MIU/L-ACNC: 1.34 UIU/ML (ref 0.3–4.2)
VIT D+METAB SERPL-MCNC: 24 NG/ML (ref 20–50)
WBC # BLD AUTO: 10.4 10E3/UL (ref 4–11)

## 2024-10-08 PROCEDURE — 85025 COMPLETE CBC W/AUTO DIFF WBC: CPT | Mod: ORL | Performed by: NURSE PRACTITIONER

## 2024-10-08 PROCEDURE — 82306 VITAMIN D 25 HYDROXY: CPT | Mod: ORL | Performed by: NURSE PRACTITIONER

## 2024-10-08 PROCEDURE — 80048 BASIC METABOLIC PNL TOTAL CA: CPT | Mod: ORL | Performed by: NURSE PRACTITIONER

## 2024-10-08 PROCEDURE — 83036 HEMOGLOBIN GLYCOSYLATED A1C: CPT | Mod: ORL | Performed by: NURSE PRACTITIONER

## 2024-10-08 PROCEDURE — 36415 COLL VENOUS BLD VENIPUNCTURE: CPT | Mod: ORL | Performed by: NURSE PRACTITIONER

## 2024-10-08 PROCEDURE — 83880 ASSAY OF NATRIURETIC PEPTIDE: CPT | Mod: ORL | Performed by: NURSE PRACTITIONER

## 2024-10-08 PROCEDURE — 84443 ASSAY THYROID STIM HORMONE: CPT | Mod: ORL | Performed by: NURSE PRACTITIONER

## 2024-10-08 PROCEDURE — P9603 ONE-WAY ALLOW PRORATED MILES: HCPCS | Mod: ORL | Performed by: NURSE PRACTITIONER

## 2024-10-08 NOTE — TELEPHONE ENCOUNTER
[facility] Geriatrics Triage Nurse Telephone Encounter    Provider: MADALYN Stockton CNP  Facility: [facility]  Facility Type:  AL    Caller: [place]  Call Back Number: [phone]    Allergies:    Allergies   Allergen Reactions    Actonel [Risedronate]     Cephalexin Other (See Comments)     Did not feel well    Cholera Vaccine Other (See Comments)    Codeine     Contrast Dye      IVP DYE    Fosamax [Alendronate]     Gadobenate Dimeglumine [Gadobenate]     Other Drug Allergy (See Comments)      Iodinated Diagnostic Agents    Diatrizoate Rash        Reason for call: Hypotension. This AM pt's BP was 78/38 P 90. at 10:30 it was 78/49 and at noon 81/47 P 101. Hx of hypotension. BP came up to normal range but she is still having balance issues. When pt stood, she almost fell backwards immediately. No dizziness reported by pt.    Verbal Order/Direction given by Provider: Add OT/PT for strengthening and balance. Encourage fluids.    Provider giving Order:  MADALYN Stockton CNP    Verbal Order given to: Pavel Hwang RN

## 2024-10-10 ENCOUNTER — TELEPHONE (OUTPATIENT)
Dept: GERIATRICS | Facility: CLINIC | Age: 89
End: 2024-10-10
Payer: COMMERCIAL

## 2024-10-10 RX ORDER — ACETAMINOPHEN 325 MG/1
650 TABLET ORAL EVERY 4 HOURS PRN
COMMUNITY

## 2024-10-10 NOTE — TELEPHONE ENCOUNTER
[facility] Geriatrics Triage Nurse Telephone Encounter    Provider: MADALYN Stockton CNP  Facility: [facility]  Facility Type:  AL    Caller: Hattie  Call Back Number:     Allergies:    Allergies   Allergen Reactions    Actonel [Risedronate]     Cephalexin Other (See Comments)     Did not feel well    Cholera Vaccine Other (See Comments)    Codeine     Contrast Dye      IVP DYE    Fosamax [Alendronate]     Gadobenate Dimeglumine [Gadobenate]     Other Drug Allergy (See Comments)      Iodinated Diagnostic Agents    Diatrizoate Rash        Reason for call: Nurse called to request a PRN order for Tylenol.  Patient had a fall the other day which resulted in an elbow fracture.  Staff has noted that when they are assisting patient with ADL's she will grimace in pain.  Currently, patient does not have anything ordered for pain relief.      Verbal Order/Direction given by Provider: Acetaminophen 650 mg PO q 4 hours PRN for pain/fever (acetaminophen not to exceed 4 grams per 24 hours) per KAITLYN.     Provider giving Order:  MADALYN Stockton CNP    Verbal Order given to: Hattie Godwin, ELDA

## 2024-10-14 ENCOUNTER — PATIENT OUTREACH (OUTPATIENT)
Dept: GERIATRIC MEDICINE | Facility: CLINIC | Age: 89
End: 2024-10-14
Payer: COMMERCIAL

## 2024-10-14 NOTE — PROGRESS NOTES
[facility] Care Coordination Contact    CC received notification of Emergency Room visit.  ER visit occurred on 10/7/24 after a fall at her AL (memory care). She was found to have a displaced olecranon process fracture which was splinted and then she returned to her [facility].     No other ED visits or hospitalizations in the last 6 months. Daily cares, including med management, provided by [facility]. Followed by onsite NP for primary care. Orders for home PT and OT.    Will continue to follow via EMR.     Emerita Avery RN  [facility]  Cell: [phone]

## 2024-10-17 ENCOUNTER — ASSISTED LIVING VISIT (OUTPATIENT)
Dept: GERIATRICS | Facility: CLINIC | Age: 89
End: 2024-10-17
Payer: COMMERCIAL

## 2024-10-17 VITALS
WEIGHT: 117 LBS | OXYGEN SATURATION: 97 % | BODY MASS INDEX: 18.36 KG/M2 | HEART RATE: 100 BPM | HEIGHT: 67 IN | SYSTOLIC BLOOD PRESSURE: 122 MMHG | RESPIRATION RATE: 18 BRPM | TEMPERATURE: 96.4 F | DIASTOLIC BLOOD PRESSURE: 80 MMHG

## 2024-10-17 DIAGNOSIS — F03.B4 MODERATE DEMENTIA WITH ANXIETY, UNSPECIFIED DEMENTIA TYPE (H): Primary | ICD-10-CM

## 2024-10-17 DIAGNOSIS — M81.0 AGE RELATED OSTEOPOROSIS, UNSPECIFIED PATHOLOGICAL FRACTURE PRESENCE: ICD-10-CM

## 2024-10-17 DIAGNOSIS — S52.021D CLOSED FRACTURE OF OLECRANON PROCESS OF RIGHT ULNA WITH ROUTINE HEALING, SUBSEQUENT ENCOUNTER: ICD-10-CM

## 2024-10-17 PROCEDURE — 99350 HOME/RES VST EST HIGH MDM 60: CPT | Performed by: NURSE PRACTITIONER

## 2024-10-17 NOTE — LETTER
" 10/17/2024      Mariah Dewitt  C/o Khloe Elliott  [address]  [place] [address]        [facility] GERIATRICS    Chief Complaint   Patient presents with     RECHECK     F/U fall elbow fracture     HPI:  Mariah Dewitt is a 89 year old  (1935), who is being seen today for an episodic care visit at: [facility]) [[id number]]. Today's concern is:   Patient calm and cooperative today during visit. Now 10 days s/p right olecranon fracture, and placed in long arm posterior mold splint. Per staff, patient has unwrapped several times and non-compliant with treatment plan. Without indications of acute pain. HPI difficult to obtain 2/2 baseline cognitive impairment.     BP Readings from Last 3 Encounters:   10/17/24 122/80   09/26/24 109/60   08/27/24 133/85     Pulse Readings from Last 4 Encounters:   10/17/24 100   09/26/24 90   08/27/24 75   05/24/24 83     Wt Readings from Last 4 Encounters:   10/17/24 53.1 kg (117 lb)   09/26/24 53.1 kg (117 lb)   08/27/24 50.7 kg (111 lb 12.8 oz)   05/24/24 49.9 kg (110 lb)         Allergies, and PMH/PSH reviewed in [employer] today.  REVIEW OF SYSTEMS:  Unobtainable secondary to cognitive impairment.     Objective:   /80   Pulse 100   Temp (!) 96.4  F (35.8  C)   Resp 18   Ht 1.702 m (5' 7\")   Wt 53.1 kg (117 lb)   SpO2 97%   BMI 18.32 kg/m    A & O x 1-2, NAD. Lungs CTA, non labored. RRR, S1/S2 w/o murmur,gallop or rub.  No edema.  Abdomen soft, nontender, +BT'S. No focal neurological deficits.  Soft cast right arm.       Recent labs in [facility] reviewed by me today.   Lab Results   Component Value Date    WBC 10.4 10/08/2024     Lab Results   Component Value Date    RBC 3.57 10/08/2024     Lab Results   Component Value Date    HGB 10.9 10/08/2024     Lab Results   Component Value Date    HCT 35.6 10/08/2024     Lab Results   Component Value Date     10/08/2024     Lab Results   Component Value Date    MCH 30.5 10/08/2024     Lab Results "   Component Value Date    MCHC 30.6 10/08/2024     Lab Results   Component Value Date    RDW 14.2 10/08/2024     Lab Results   Component Value Date     10/08/2024     Last Comprehensive Metabolic Panel:  Lab Results   Component Value Date     10/08/2024    POTASSIUM 4.0 10/08/2024    CHLORIDE 104 10/08/2024    CO2 26 10/08/2024    ANIONGAP 10 10/08/2024    GLC 79 10/08/2024    BUN 23.7 (H) 10/08/2024    CR 0.85 10/08/2024    GFRESTIMATED 66 10/08/2024    LYDIA 9.1 10/08/2024     Assessment/Plan:     Moderate dementia with anxiety, unspecified dementia type (H)  Closed fracture of olecranon process of right ulna with routine healing, subsequent encounter  Age related osteoporosis, unspecified pathological fracture presence  Provider reviewed records from facility, and interpreted most recent imaging/lab work, and vital signs.   Recent mechanical fall with acute right olecranon fracture. Non-complaint with soft cast with splint. Is being following by Dr. Jad Urias with orthopedics. Next appt scheduled for 10/28/24. Due to cognitive impairment, difficult to bring to appts.   Danii Maicas with ortho will assess and make plan tomorrow 10/28/24. Staff aware a [place].   Chronic, progressive dementia. Patient resides on memory care assisted living. Continue assistance with medication administration, meals and safety.     MED REC REQUIRED  Post Medication Reconciliation Status: patient was not discharged from an inpatient facility or TCU      61 minutes spent on the date of the encounter doing chart review, history and exam, documentation and further activities as noted above.     Orders:  The current medical regimen is effective; continue present plan and medications.      Electronically signed by: Tamie Costa NP           Sincerely,        Tamie Costa NP

## 2024-10-17 NOTE — PROGRESS NOTES
"[facility] GERIATRICS    Chief Complaint   Patient presents with    RECHECK     F/U fall elbow fracture     HPI:  Mariah Dewitt is a 89 year old  (1935), who is being seen today for an episodic care visit at: [facility]) [[id number]]. Today's concern is:   Patient calm and cooperative today during visit. Now 10 days s/p right olecranon fracture, and placed in long arm posterior mold splint. Per staff, patient has unwrapped several times and non-compliant with treatment plan. Without indications of acute pain. HPI difficult to obtain 2/2 baseline cognitive impairment.     BP Readings from Last 3 Encounters:   10/17/24 122/80   09/26/24 109/60   08/27/24 133/85     Pulse Readings from Last 4 Encounters:   10/17/24 100   09/26/24 90   08/27/24 75   05/24/24 83     Wt Readings from Last 4 Encounters:   10/17/24 53.1 kg (117 lb)   09/26/24 53.1 kg (117 lb)   08/27/24 50.7 kg (111 lb 12.8 oz)   05/24/24 49.9 kg (110 lb)         Allergies, and PMH/PSH reviewed in [employer] today.  REVIEW OF SYSTEMS:  Unobtainable secondary to cognitive impairment.     Objective:   /80   Pulse 100   Temp (!) 96.4  F (35.8  C)   Resp 18   Ht 1.702 m (5' 7\")   Wt 53.1 kg (117 lb)   SpO2 97%   BMI 18.32 kg/m    A & O x 1-2, NAD. Lungs CTA, non labored. RRR, S1/S2 w/o murmur,gallop or rub.  No edema.  Abdomen soft, nontender, +BT'S. No focal neurological deficits.  Soft cast right arm.       Recent labs in "[employer] reviewed by me today.   Lab Results   Component Value Date    WBC 10.4 10/08/2024     Lab Results   Component Value Date    RBC 3.57 10/08/2024     Lab Results   Component Value Date    HGB 10.9 10/08/2024     Lab Results   Component Value Date    HCT 35.6 10/08/2024     Lab Results   Component Value Date     10/08/2024     Lab Results   Component Value Date    MCH 30.5 10/08/2024     Lab Results   Component Value Date    MCHC 30.6 10/08/2024     Lab Results   Component Value Date    RDW 14.2 " 10/08/2024     Lab Results   Component Value Date     10/08/2024     Last Comprehensive Metabolic Panel:  Lab Results   Component Value Date     10/08/2024    POTASSIUM 4.0 10/08/2024    CHLORIDE 104 10/08/2024    CO2 26 10/08/2024    ANIONGAP 10 10/08/2024    GLC 79 10/08/2024    BUN 23.7 (H) 10/08/2024    CR 0.85 10/08/2024    GFRESTIMATED 66 10/08/2024    LYDIA 9.1 10/08/2024     Assessment/Plan:     Moderate dementia with anxiety, unspecified dementia type (H)  Closed fracture of olecranon process of right ulna with routine healing, subsequent encounter  Age related osteoporosis, unspecified pathological fracture presence  Provider reviewed records from facility, and interpreted most recent imaging/lab work, and vital signs.   Recent mechanical fall with acute right olecranon fracture. Non-complaint with soft cast with splint. Is being following by Dr. Jad Urias with orthopedics. Next appt scheduled for 10/28/24. Due to cognitive impairment, difficult to bring to appts.   Danii Macias with ortho will assess and make plan tomorrow 10/28/24. Staff aware a [facility] [place].   Chronic, progressive dementia. Patient resides on memory care assisted living. Continue assistance with medication administration, meals and safety.     MED REC REQUIRED  Post Medication Reconciliation Status: patient was not discharged from an inpatient facility or TCU      61 minutes spent on the date of the encounter doing chart review, history and exam, documentation and further activities as noted above.     Orders:  The current medical regimen is effective; continue present plan and medications.      Electronically signed by: Tamie Costa NP

## 2024-10-18 ENCOUNTER — ASSISTED LIVING VISIT (OUTPATIENT)
Dept: GERIATRICS | Facility: CLINIC | Age: 89
End: 2024-10-18
Payer: COMMERCIAL

## 2024-10-18 VITALS
BODY MASS INDEX: 18.36 KG/M2 | RESPIRATION RATE: 18 BRPM | WEIGHT: 117 LBS | HEIGHT: 67 IN | TEMPERATURE: 96.4 F | SYSTOLIC BLOOD PRESSURE: 122 MMHG | HEART RATE: 100 BPM | DIASTOLIC BLOOD PRESSURE: 80 MMHG | OXYGEN SATURATION: 97 %

## 2024-10-18 DIAGNOSIS — S52.021D CLOSED FRACTURE OF OLECRANON PROCESS OF RIGHT ULNA WITH ROUTINE HEALING, SUBSEQUENT ENCOUNTER: Primary | ICD-10-CM

## 2024-10-18 PROCEDURE — 99349 HOME/RES VST EST MOD MDM 40: CPT | Performed by: NURSE PRACTITIONER

## 2024-10-18 NOTE — LETTER
" 10/18/2024      Mariah Dewitt  C/o Khloe Elliott  [address]  [place] [address]        [facility] GERIATRICS    Chief Complaint   Patient presents with     RECHECK     Ortho- splint issues; R olecranon fracture DOI 10/7     HPI:  Mariah Dewitt is a 89 year old  (1935), who is being seen today for an episodic care visit at: [facility]) [[id number]].     Today's Visit:  Injury: R olecranon fracture sustained from ground-level fall  DOI: 10/7/24  Orthopedic Provider: Dr. Jad Urias ([facility])  Last saw Ortho:  10/14/24  Current WB status:  NWB NORMA in splint  Pain management:  acetaminophen 650mg q4h PRN    Rebecca is seen today in her memory care unit, sitting at breakfast table in wheelchair.  In good spirits.  Does not recall breaking her elbow but recognizes she is in a splint-- complains that it is very heavy but otherwise is not bothering her.  Staff put her in a long sleeve zip up today that prevents her from picking at splint.  Per staff (RA and nurses), she unwraps the ACE from the splint and they are continually having to re-wrap.  The nurses report she has completely removed the splint in the past day or two.  They report this seems to happen mostly at night when arm is more exposed (in pajamas/short-sleeves).     Rebecca denies pain today.  Does have a fair amount of edema and ecchymosis in the hand.  Denies numbness/tingling in hand.  Splint is currently intact.  Not using sling (was directed to wean out at visit 10/14).  Staff are wondering if there are alternatives to the splint given her repeated removal.  Of note, she is being followed by [facility] ortho and is scheduled to see for recheck 10/28.      Allergies, and PMH/PSH reviewed in EPIC today.  REVIEW OF SYSTEMS:  As noted in HPI    Objective:   /80   Pulse 100   Temp (!) 96.4  F (35.8  C)   Resp 18   Ht 1.702 m (5' 7\")   Wt 53.1 kg (117 lb)   SpO2 97%   BMI 18.32 kg/m    GENERAL APPEARANCE:  Alert, in no " distress, thin, cooperative  M/S:   RUE in long arm splint.  Presently intact ACE wrap with long-sleeved zip up over the top to deter her from picking.  R hand with ecchymosis and edema throughout, limited finger ROM as result.  Denies pain/tenderness with palpation along hand.  No ecchymosis or edema noted to upper arm.  Splint fiberglass appears to be sliding down her hand some and is now under palmar aspect of hand.    PSYCH:  insight and judgement impaired, memory impaired       X-rays from 10/7 taken at [facility]    Assessment/Plan:  (S57.217X) Closed fracture of olecranon process of right ulna with routine healing, subsequent encounter  (primary encounter diagnosis)  Writer spoke with nursing and resident assistants regarding Rebecca.  RA's report she unwraps the splint but does not completely remove, however nursing reports she has successfully removed the entire splint previously.  They have tried to deter with long-sleeved shirts, redirecting, sling but patient still finds a way to manipulate.  No concerns of increased pain reported from patient despite lack of good immobilization.  Reviewed with nursing and patient's two children that could proceed without immobilization, knowing that her fracture would likely not heal well but only deficit would be weak push-off strength due to triceps attachment at the area.  However, patient has already been seen by [facility] ortho--writer reached out to [facility] ortho team to discuss options given her ongoing manipulation of the splint.  They feel best treatment avenue would be to see her in clinic for cast-room visit to apply fiberglass over top of splint so she cannot pick at it.  Spoke with family who are agreeable to  bring her in for cast only appointment.  Facility updated- they will go in Tuesday in early afternoon.  Plan:   -Continue to re-wrap splint and place patient in long-sleeves to deter from manipulation   -Family to take patient  in for fiberglass placement over top of splint on Tuesday  -Ortho recheck 10/28 at [facility] [place]      Electronically signed by: MADALYN Tamez CNP         Sincerely,        MADALYN Tamez CNP

## 2024-10-21 NOTE — PROGRESS NOTES
"[facility] GERIATRICS    Chief Complaint   Patient presents with    RECHECK     Ortho- splint issues; R olecranon fracture DOI 10/7     HPI:  Mariah Dewitt is a 89 year old  (1935), who is being seen today for an episodic care visit at: [facility] ([facility]) [[id number]].     Today's Visit:  Injury: R olecranon fracture sustained from ground-level fall  DOI: 10/7/24  Orthopedic Provider: Dr. Jad Urias ([facility])  Last saw Ortho:  10/14/24  Current WB status:  NWB RUE in splint  Pain management:  acetaminophen 650mg q4h PRN    Rebecca is seen today in her memory care unit, sitting at breakfast table in wheelchair.  In good spirits.  Does not recall breaking her elbow but recognizes she is in a splint-- complains that it is very heavy but otherwise is not bothering her.  Staff put her in a long sleeve zip up today that prevents her from picking at splint.  Per staff (RA and nurses), she unwraps the ACE from the splint and they are continually having to re-wrap.  The nurses report she has completely removed the splint in the past day or two.  They report this seems to happen mostly at night when arm is more exposed (in pajamas/short-sleeves).     Rebecca denies pain today.  Does have a fair amount of edema and ecchymosis in the hand.  Denies numbness/tingling in hand.  Splint is currently intact.  Not using sling (was directed to wean out at visit 10/14).  Staff are wondering if there are alternatives to the splint given her repeated removal.  Of note, she is being followed by [facility] ortho and is scheduled to see for recheck 10/28.      Allergies, and PMH/PSH reviewed in EPIC today.  REVIEW OF SYSTEMS:  As noted in HPI    Objective:   /80   Pulse 100   Temp (!) 96.4  F (35.8  C)   Resp 18   Ht 1.702 m (5' 7\")   Wt 53.1 kg (117 lb)   SpO2 97%   BMI 18.32 kg/m    GENERAL APPEARANCE:  Alert, in no distress, thin, cooperative  M/S:   RUE in long arm splint.  Presently intact ACE wrap with " long-sleeved zip up over the top to deter her from picking.  R hand with ecchymosis and edema throughout, limited finger ROM as result.  Denies pain/tenderness with palpation along hand.  No ecchymosis or edema noted to upper arm.  Splint fiberglass appears to be sliding down her hand some and is now under palmar aspect of hand.    PSYCH:  insight and judgement impaired, memory impaired       [place] from [place]7 [place] [facility] unavailable for review in [facility]    Assessment/Plan:  (V54.495X) Closed fracture of olecranon process of right ulna with routine healing, subsequent encounter  (primary encounter diagnosis)  Writer spoke with nursing and resident assistants regarding Rebecca.  RA's report she unwraps the splint but does not completely remove, however nursing reports she has successfully removed the entire splint previously.  They have tried to deter with long-sleeved shirts, redirecting, sling but patient still finds a way to manipulate.  No concerns of increased pain reported from patient despite lack of good immobilization.  Reviewed with nursing and patient's two children that could proceed without immobilization, knowing that her fracture would likely not heal well but only deficit would be weak push-off strength due to triceps attachment at the area.  However, patient has already been seen by [facility] ortho--writer reached out to [facility] ortho team to discuss options given her ongoing manipulation of the splint.  They feel best treatment avenue would be to see her in clinic for cast-room visit to apply fiberglass over top of splint so she cannot pick at it.  Spoke with family who are agreeable to  bring her in for cast only appointment.  Facility updated- they will go in Tuesday in early afternoon.  Plan:   -Continue to re-wrap splint and place patient in long-sleeves to deter from manipulation   -Family to take patient in for fiberglass placement over top of splint on Tuesday  -[facility]      Electronically signed by: MADALYN Tamez CNP

## 2024-12-12 ENCOUNTER — PATIENT OUTREACH (OUTPATIENT)
Dept: GERIATRIC MEDICINE | Facility: CLINIC | Age: 89
End: 2024-12-12
Payer: COMMERCIAL

## 2024-12-12 NOTE — PROGRESS NOTES
[facility] Care Coordination Contact    Member admitted on 12/5/24 for L hip pain after fall and was found to have a closed displaced fracture of left femoral neck.     [facility]  Do you have a follow-up appointment scheduled? Yes     Comments: Seen by hospice upon return to enroll. Followed by onsite NP.   Can you verbalize the warning signs/symptoms to watch for & how to respond? No    Comments: Resides in memory care - staff provide 24/7 supervision and oversight. Informal support from family.    Do you have a Personal Health Care Record? Yes     CM:  Was medication reconciliation completed?  Yes      Comments: Discharge orders provided to the AL who manages all medications. Enrolling into hospice.   CM:  Any homecare/DME or resource needs (etc.) from most recent admit?     Enrolled in hospice who will manage all equipment and supplies.        Will not open member to case management as she enrolled in hospice. She has support in place from AL, hospice, and family. No outstanding needs identified at this time.     Emerita Avery RN  [facility]  Cell: [phone]

## 2024-12-19 ENCOUNTER — TELEPHONE (OUTPATIENT)
Dept: GERIATRICS | Facility: CLINIC | Age: 89
End: 2024-12-19
Payer: COMMERCIAL

## 2024-12-19 NOTE — TELEPHONE ENCOUNTER
Geriatrics Notification of Patient Death    Provider: MADALYN Saucedo CNP, DNP  Place of death: [facility]   Facility Type:  AL    Caller: Ally   Date of Death: 12/18/24 @ 9pm     Patient was on Hospice care: Yes; please explain: [facility]   Patient was seen by [facility] Geriatrics provider: Yes; please explain: Last seen by provider  VANITA Macias and JOZEF Costa on 10/17 and 10/18.         Polina Briscoe RN

## 2024-12-31 ENCOUNTER — PATIENT OUTREACH (OUTPATIENT)
Dept: GERIATRIC MEDICINE | Facility: CLINIC | Age: 89
End: 2024-12-31
Payer: COMMERCIAL

## 2024-12-31 NOTE — PROGRESS NOTES
[facility] Care Coordination Contact    Date of Disenrollment: 12/18/24  Reason for Disenrollment: Member passed away - was enrolled in hospice.    Death notification faxed to [facility] and [facility] notified of disenrollment.    Emerita Avery RN  [facility]  Cell: [phone]